# Patient Record
Sex: FEMALE | HISPANIC OR LATINO | URBAN - METROPOLITAN AREA
[De-identification: names, ages, dates, MRNs, and addresses within clinical notes are randomized per-mention and may not be internally consistent; named-entity substitution may affect disease eponyms.]

---

## 2019-04-11 ENCOUNTER — WALK IN (OUTPATIENT)
Dept: URGENT CARE | Age: 24
End: 2019-04-11

## 2019-04-11 DIAGNOSIS — J02.0 STREPTOCOCCAL PHARYNGITIS: Primary | ICD-10-CM

## 2019-04-11 LAB
INTERNAL PROCEDURAL CONTROLS ACCEPTABLE: YES
S PYO AG THROAT QL IA.RAPID: POSITIVE

## 2019-04-11 PROCEDURE — X0943 AMG SELF PAY VISIT: HCPCS | Performed by: NURSE PRACTITIONER

## 2019-04-11 RX ORDER — AMOXICILLIN 875 MG/1
875 TABLET, COATED ORAL 2 TIMES DAILY
Qty: 20 TABLET | Refills: 0 | Status: SHIPPED | OUTPATIENT
Start: 2019-04-11 | End: 2019-04-21

## 2019-04-11 ASSESSMENT — PAIN SCALES - GENERAL: PAINLEVEL: 9-10

## 2019-04-11 ASSESSMENT — ENCOUNTER SYMPTOMS
FEVER: 1
NAUSEA: 1
FATIGUE: 1
CHILLS: 1
SORE THROAT: 1
APPETITE CHANGE: 1
RESPIRATORY NEGATIVE: 1

## 2021-05-26 VITALS
HEIGHT: 61 IN | WEIGHT: 159.06 LBS | HEART RATE: 110 BPM | BODY MASS INDEX: 30.03 KG/M2 | SYSTOLIC BLOOD PRESSURE: 100 MMHG | RESPIRATION RATE: 12 BRPM | TEMPERATURE: 99.7 F | DIASTOLIC BLOOD PRESSURE: 60 MMHG

## 2021-07-13 ENCOUNTER — OFFICE VISIT (OUTPATIENT)
Dept: SURGERY | Age: 26
End: 2021-07-13

## 2021-07-13 VITALS
SYSTOLIC BLOOD PRESSURE: 115 MMHG | OXYGEN SATURATION: 98 % | BODY MASS INDEX: 30.47 KG/M2 | DIASTOLIC BLOOD PRESSURE: 78 MMHG | HEART RATE: 68 BPM | HEIGHT: 60 IN | WEIGHT: 155.2 LBS | TEMPERATURE: 97.7 F

## 2021-07-13 DIAGNOSIS — N64.82 MICROMASTIA: Primary | ICD-10-CM

## 2021-07-13 DIAGNOSIS — E88.1 LIPODYSTROPHY: ICD-10-CM

## 2021-07-13 PROCEDURE — 99203 OFFICE O/P NEW LOW 30 MIN: CPT | Performed by: SURGERY

## 2021-07-13 ASSESSMENT — PAIN SCALES - GENERAL: PAINLEVEL: 0

## 2021-11-12 ENCOUNTER — TELEPHONE (OUTPATIENT)
Dept: SURGERY | Age: 26
End: 2021-11-12

## 2021-11-24 ENCOUNTER — TELEPHONE (OUTPATIENT)
Dept: SURGERY | Age: 26
End: 2021-11-24

## 2022-12-07 LAB
AMB EXT CHLAMYDIA, NUCLEIC ACID AMP: NEGATIVE
AMB EXT GONOCOCCUS, NUCLEIC ACID AMP: NEGATIVE
AMB EXT TREPONEMAL ANTIBODIES: NONREACTIVE
HEPATITIS B SURFACE ANTIGEN OB: NEGATIVE
HIV RESULT OB: NEGATIVE

## 2022-12-08 LAB
ANTIBODY SCREEN OB: NEGATIVE
RH FACTOR OB: POSITIVE

## 2023-05-03 LAB
AMB EXT TREPONEMAL ANTIBODIES: NEGATIVE
HIV RESULT OB: NEGATIVE

## 2023-06-25 ENCOUNTER — HOSPITAL ENCOUNTER (INPATIENT)
Facility: HOSPITAL | Age: 28
LOS: 2 days | Discharge: HOME | End: 2023-06-27
Attending: OBSTETRICS & GYNECOLOGY | Admitting: OBSTETRICS & GYNECOLOGY
Payer: MEDICAID

## 2023-06-25 PROBLEM — Z34.90 PREGNANCY: Status: ACTIVE | Noted: 2023-06-25

## 2023-06-25 LAB
ANTIBODY SCREEN: NEGATIVE
BASOPHILS # BLD AUTO: 0.03 X10(3) UL (ref 0–0.2)
BASOPHILS NFR BLD AUTO: 0.2 %
DEPRECATED RDW RBC AUTO: 39.9 FL (ref 35.1–46.3)
EOSINOPHIL # BLD AUTO: 0.09 X10(3) UL (ref 0–0.7)
EOSINOPHIL NFR BLD AUTO: 0.7 %
ERYTHROCYTE [DISTWIDTH] IN BLOOD BY AUTOMATED COUNT: 12.7 % (ref 11–15)
HCT VFR BLD AUTO: 35.9 %
HGB BLD-MCNC: 12.1 G/DL
IMM GRANULOCYTES # BLD AUTO: 0.04 X10(3) UL (ref 0–1)
IMM GRANULOCYTES NFR BLD: 0.3 %
LYMPHOCYTES # BLD AUTO: 2.19 X10(3) UL (ref 1–4)
LYMPHOCYTES NFR BLD AUTO: 16.4 %
MCH RBC QN AUTO: 29.2 PG (ref 26–34)
MCHC RBC AUTO-ENTMCNC: 33.7 G/DL (ref 31–37)
MCV RBC AUTO: 86.5 FL
MONOCYTES # BLD AUTO: 0.63 X10(3) UL (ref 0.1–1)
MONOCYTES NFR BLD AUTO: 4.7 %
NEUTROPHILS # BLD AUTO: 10.38 X10 (3) UL (ref 1.5–7.7)
NEUTROPHILS # BLD AUTO: 10.38 X10(3) UL (ref 1.5–7.7)
NEUTROPHILS NFR BLD AUTO: 77.7 %
PLATELET # BLD AUTO: 184 10(3)UL (ref 150–450)
RBC # BLD AUTO: 4.15 X10(6)UL
RH BLOOD TYPE: POSITIVE
RH BLOOD TYPE: POSITIVE
WBC # BLD AUTO: 13.4 X10(3) UL (ref 4–11)

## 2023-06-25 PROCEDURE — 59409 OBSTETRICAL CARE: CPT | Performed by: OBSTETRICS & GYNECOLOGY

## 2023-06-25 RX ORDER — AMMONIA INHALANTS 0.04 G/.3ML
0.3 INHALANT RESPIRATORY (INHALATION) AS NEEDED
Status: DISCONTINUED | OUTPATIENT
Start: 2023-06-25 | End: 2023-06-27

## 2023-06-25 RX ORDER — BISACODYL 10 MG
10 SUPPOSITORY, RECTAL RECTAL ONCE AS NEEDED
Status: DISCONTINUED | OUTPATIENT
Start: 2023-06-25 | End: 2023-06-27

## 2023-06-25 RX ORDER — SIMETHICONE 80 MG
80 TABLET,CHEWABLE ORAL 3 TIMES DAILY PRN
Status: DISCONTINUED | OUTPATIENT
Start: 2023-06-25 | End: 2023-06-27

## 2023-06-25 RX ORDER — DEXTROSE, SODIUM CHLORIDE, SODIUM LACTATE, POTASSIUM CHLORIDE, AND CALCIUM CHLORIDE 5; .6; .31; .03; .02 G/100ML; G/100ML; G/100ML; G/100ML; G/100ML
INJECTION, SOLUTION INTRAVENOUS CONTINUOUS
Status: DISCONTINUED | OUTPATIENT
Start: 2023-06-25 | End: 2023-06-25 | Stop reason: HOSPADM

## 2023-06-25 RX ORDER — LIDOCAINE HYDROCHLORIDE 10 MG/ML
30 INJECTION, SOLUTION EPIDURAL; INFILTRATION; INTRACAUDAL; PERINEURAL ONCE
Status: COMPLETED | OUTPATIENT
Start: 2023-06-25 | End: 2023-06-25

## 2023-06-25 RX ORDER — TRISODIUM CITRATE DIHYDRATE AND CITRIC ACID MONOHYDRATE 500; 334 MG/5ML; MG/5ML
30 SOLUTION ORAL AS NEEDED
Status: DISCONTINUED | OUTPATIENT
Start: 2023-06-25 | End: 2023-06-25 | Stop reason: HOSPADM

## 2023-06-25 RX ORDER — LIDOCAINE HYDROCHLORIDE 10 MG/ML
INJECTION, SOLUTION EPIDURAL; INFILTRATION; INTRACAUDAL; PERINEURAL
Status: DISCONTINUED
Start: 2023-06-25 | End: 2023-06-25 | Stop reason: WASHOUT

## 2023-06-25 RX ORDER — ALBUTEROL SULFATE 2.5 MG/3ML
3 SOLUTION RESPIRATORY (INHALATION) EVERY 6 HOURS PRN
Status: ON HOLD | COMMUNITY
End: 2023-06-25

## 2023-06-25 RX ORDER — ACETAMINOPHEN 500 MG
1000 TABLET ORAL EVERY 6 HOURS PRN
Status: DISCONTINUED | OUTPATIENT
Start: 2023-06-25 | End: 2023-06-25 | Stop reason: HOSPADM

## 2023-06-25 RX ORDER — DOCUSATE SODIUM 100 MG/1
100 CAPSULE, LIQUID FILLED ORAL
Status: DISCONTINUED | OUTPATIENT
Start: 2023-06-25 | End: 2023-06-27

## 2023-06-25 RX ORDER — ALBUTEROL SULFATE 90 UG/1
2 AEROSOL, METERED RESPIRATORY (INHALATION) EVERY 6 HOURS PRN
COMMUNITY
End: 2023-06-27

## 2023-06-25 RX ORDER — ONDANSETRON 2 MG/ML
4 INJECTION INTRAMUSCULAR; INTRAVENOUS EVERY 6 HOURS PRN
Status: DISCONTINUED | OUTPATIENT
Start: 2023-06-25 | End: 2023-06-25 | Stop reason: HOSPADM

## 2023-06-25 RX ORDER — CHOLECALCIFEROL (VITAMIN D3) 25 MCG
1 TABLET,CHEWABLE ORAL DAILY
COMMUNITY

## 2023-06-25 RX ORDER — IBUPROFEN 600 MG/1
600 TABLET ORAL ONCE AS NEEDED
Status: DISCONTINUED | OUTPATIENT
Start: 2023-06-25 | End: 2023-06-25 | Stop reason: HOSPADM

## 2023-06-25 RX ORDER — SODIUM CHLORIDE, SODIUM LACTATE, POTASSIUM CHLORIDE, CALCIUM CHLORIDE 600; 310; 30; 20 MG/100ML; MG/100ML; MG/100ML; MG/100ML
INJECTION, SOLUTION INTRAVENOUS AS NEEDED
Status: DISCONTINUED | OUTPATIENT
Start: 2023-06-25 | End: 2023-06-25 | Stop reason: HOSPADM

## 2023-06-25 RX ORDER — ACETAMINOPHEN 500 MG
1000 TABLET ORAL EVERY 6 HOURS PRN
Status: DISCONTINUED | OUTPATIENT
Start: 2023-06-25 | End: 2023-06-27

## 2023-06-25 RX ORDER — TERBUTALINE SULFATE 1 MG/ML
0.25 INJECTION, SOLUTION SUBCUTANEOUS AS NEEDED
Status: DISCONTINUED | OUTPATIENT
Start: 2023-06-25 | End: 2023-06-25 | Stop reason: HOSPADM

## 2023-06-25 RX ORDER — IBUPROFEN 600 MG/1
600 TABLET ORAL EVERY 6 HOURS
Status: DISCONTINUED | OUTPATIENT
Start: 2023-06-25 | End: 2023-06-27

## 2023-06-25 RX ORDER — ONDANSETRON 2 MG/ML
4 INJECTION INTRAMUSCULAR; INTRAVENOUS EVERY 6 HOURS PRN
Status: DISCONTINUED | OUTPATIENT
Start: 2023-06-25 | End: 2023-06-27

## 2023-06-25 RX ORDER — ACETAMINOPHEN 500 MG
500 TABLET ORAL EVERY 6 HOURS PRN
Status: DISCONTINUED | OUTPATIENT
Start: 2023-06-25 | End: 2023-06-27

## 2023-06-25 RX ORDER — OXYTOCIN 10 [USP'U]/ML
INJECTION, SOLUTION INTRAMUSCULAR; INTRAVENOUS
Status: DISCONTINUED
Start: 2023-06-25 | End: 2023-06-25 | Stop reason: WASHOUT

## 2023-06-25 RX ORDER — DIAPER,BRIEF,INFANT-TODD,DISP
1 EACH MISCELLANEOUS EVERY 6 HOURS PRN
Status: DISCONTINUED | OUTPATIENT
Start: 2023-06-25 | End: 2023-06-27

## 2023-06-25 RX ORDER — ACETAMINOPHEN 500 MG
500 TABLET ORAL EVERY 6 HOURS PRN
Status: DISCONTINUED | OUTPATIENT
Start: 2023-06-25 | End: 2023-06-25 | Stop reason: HOSPADM

## 2023-06-25 NOTE — H&P
[address] Patient Status:  Outpatient    1995 MRN [id number]   Location [address] [place] [address] Attending Luiza Del Real MD   Hosp Day # 0 PCP No primary care provider on file. Date of Admission:  2023      HPI:   Simon Enciso is a 32year old  female, current EGA of 36w4d with an estimated date of delivery of: Estimated Date of Delivery: 23      Simon Enciso is being admitted for labor management. Her current obstetrical history is significant for asthma. Patient reports contractions since earlier today . Fetal Movement: normal.     History   Obstetric History:   OB History    Para Term  AB Living   4 2 2   1 2   SAB IAB Ectopic Multiple Live Births           2      # Outcome Date GA Lbr Erick/2nd Weight Sex Delivery Anes PTL Lv   4 Current            3 AB            2 Term 08/05/15 37w5d  5 lb 6 oz (2.438 kg) F  EPI Y JAILYN   1 Term     M NORMAL SPONT EPI  JAILYN     Past Medical History: No past medical history on file. Past Social History: No past surgical history on file. Family History: No family history on file. Social History: Social History    Tobacco Use      Smoking status: Not on file      Smokeless tobacco: Not on file    Alcohol use: Not on file       Allergies/Medications:    Allergies:   No Known Allergies  Medications:  albuterol 108 (90 Base) MCG/ACT Inhalation Aero Soln, Inhale 2 puffs into the lungs every 6 (six) hours as needed for Wheezing., Disp: , Rfl:   albuterol (2.5 MG/3ML) 0.083% Inhalation Nebu Soln, Take 3 mL by nebulization every 6 (six) hours as needed for Wheezing., Disp: , Rfl:         Review of Systems:   As documented in HPI    contractions since earlier today    Physical Exam:        Constitutional: alert, appears stated age and cooperative  Respiratory: clear to auscultation bilaterally  Cardiac: regular rate and rhythm, S1, S2 normal, no murmur, click, rub or gallop  Abdomen: FHT present  Fetal Surveillance:  140 BPM; Fetal heart variability: moderate  Sterile vaginal exam: Complete/ +1 station  Cervix: no lesions or cervical motion tenderness    Results:     Lab Results   Component Value Date    TREPONEMALAB negative 05/03/2023    RAPIDHIVSCRN Negative 05/03/2023    ABO A 12/08/2022    RH Positive 12/08/2022       No results found for: DDIMER, ESRML, ESRPF, CRP, BNP, MG, PHOS, TROP, TROPHS, CK, CKMB, CRISSY, RPR, B12, ETOH, COLORUR, CLARITY, SPECGRAVITY, PROUR, GLUUR, KETUR, BILUR, BLOODURINE, NITRITE, UROBILINOGEN, LEUUR, UASA, POCGLU, BLDCUL, BLDSMR    No results found. Assessment/Plan:    Warren Shane is at an estimated gestational age of 37w2d with an estimated date of delivery of:  Estimated Date of Delivery: 7/19/23    Active phase labor. Obstetrical history significant for asthma. Admission problem(s):    Pregnancy  Labor    Imminent delivery      Risks, benefits, alternatives and possible complications have been discussed in detail with the patient. Pre-admission, admission, and post admission procedures and expectations were discussed in detail. All questions answered, all appropriate consents will be signed at the Hospital. Admission is planned for today. Expectant management. Jermaine Mata MD  6/25/2023  8:30 AM

## 2023-06-25 NOTE — PLAN OF CARE
Mom received into postpartum room in stable condition with all belongings and care partner. Assisted into bed in low locked position with side rails up and call light in reach. Admission fundal assessment completed, vitals stable, bleeding within normal limits. Report received from labor RN Arcadio Delgado. Patient and family oriented to room, plan of care discussed.

## 2023-06-25 NOTE — PROGRESS NOTES
Patient up to bathroom with assist x 2. Voided 400 ml. Patient transferred to mother/baby room 359 per wheelchair in stable condition with baby and personal belongings. Accompanied by mom and staff. Report given to LIAM LOPEZ RN.

## 2023-06-25 NOTE — PROGRESS NOTES
Pt is a 32year old female admitted to [facility] [address] [place] [address]. No chief complaint on file. laboring since 545   Pt is  36w4d intra-uterine pregnancy. History obtained, consents signed. Oriented to room, staff, and plan of care.

## 2023-06-26 PROBLEM — Z28.39 RUBELLA NON-IMMUNE STATUS, ANTEPARTUM: Status: ACTIVE | Noted: 2023-06-26

## 2023-06-26 PROBLEM — O09.899 RUBELLA NON-IMMUNE STATUS, ANTEPARTUM: Status: ACTIVE | Noted: 2023-06-26

## 2023-06-26 LAB
BASOPHILS # BLD AUTO: 0.03 X10(3) UL (ref 0–0.2)
BASOPHILS NFR BLD AUTO: 0.3 %
DEPRECATED RDW RBC AUTO: 38.4 FL (ref 35.1–46.3)
EOSINOPHIL # BLD AUTO: 0.22 X10(3) UL (ref 0–0.7)
EOSINOPHIL NFR BLD AUTO: 2.4 %
ERYTHROCYTE [DISTWIDTH] IN BLOOD BY AUTOMATED COUNT: 12.6 % (ref 11–15)
HCT VFR BLD AUTO: 31.1 %
HGB BLD-MCNC: 10.7 G/DL
IMM GRANULOCYTES # BLD AUTO: 0.04 X10(3) UL (ref 0–1)
IMM GRANULOCYTES NFR BLD: 0.4 %
LYMPHOCYTES # BLD AUTO: 2.46 X10(3) UL (ref 1–4)
LYMPHOCYTES NFR BLD AUTO: 26.4 %
MCH RBC QN AUTO: 29.4 PG (ref 26–34)
MCHC RBC AUTO-ENTMCNC: 34.4 G/DL (ref 31–37)
MCV RBC AUTO: 85.4 FL
MONOCYTES # BLD AUTO: 0.68 X10(3) UL (ref 0.1–1)
MONOCYTES NFR BLD AUTO: 7.3 %
NEUTROPHILS # BLD AUTO: 5.89 X10 (3) UL (ref 1.5–7.7)
NEUTROPHILS # BLD AUTO: 5.89 X10(3) UL (ref 1.5–7.7)
NEUTROPHILS NFR BLD AUTO: 63.2 %
PLATELET # BLD AUTO: 169 10(3)UL (ref 150–450)
RBC # BLD AUTO: 3.64 X10(6)UL
WBC # BLD AUTO: 9.3 X10(3) UL (ref 4–11)

## 2023-06-26 NOTE — CM/SW NOTE
SW self referral due to finances/WIC resources    SW met with patient bedside. SW confirmed face sheet contact as correct. Baby boy/girl name:Baby boy [email or web address]  Date & time of delivery:6/25/23 @ 8:33am  Delivery method:Normal spontaneous vaginal delivery   Siblings age: 15 and 9 yr old    Patient employed: Yes  Length of maternity leave: 16 weeks    Father of baby employed:Yes  Length of paternity leave:6 weeks    Breast or formula feed:Formula feed    Pediatrician: BALTAZAR  SW encouraged pt to schedule infant first appointment (usually within 48 hours of discharge) prior to pt discharge. Pt expressed understanding. Infant Insurance:Medicaid  Change HC contacted:Yes    Mental Health History: Denied     Medications:n/a    Therapist:n/a    Psychiatrist:n/a    SW discussed signs, symptoms and risks associated with post partum depression & anxiety. SW provided pt with PMAD resources. Other resources provided: [facility] area specific resources. Pt endorses she is current w/WIC services. Patient support system: Pt's mother    Patient denied current questions/needs from MARIO.    SW/CM to remain available for support and/or discharge planning.       ONI Turcios, [facility]  Social Work   ZXK:#[id number]

## 2023-06-26 NOTE — PLAN OF CARE
Problem: POSTPARTUM  Goal: Long Term Goal:Experiences normal postpartum course  Description: INTERVENTIONS:  - Assess and monitor vital signs and lab values. - Assess fundus and lochia. - Provide ice/sitz baths for perineum discomfort. - Monitor healing of incision/episiotomy/laceration, and assess for signs and symptoms of infection and hematoma. - Assess bladder function and monitor for bladder distention.  - Provide/instruct/assist with pericare as needed. - Provide VTE prophylaxis as needed. - Monitor bowel function.  - Encourage ambulation and provide assistance as needed. - Assess and monitor emotional status and provide social service/psych resources as needed. - Utilize standard precautions and use personal protective equipment as indicated. Ensure aseptic care of all intravenous lines and invasive tubes/drains.  - Obtain immunization and exposure to communicable diseases history. Outcome: Progressing  Goal: Experiences normal breast weaning course  Description: INTERVENTIONS:  - Assess for and manage engorgement. - Instruct on breast care. - Provide comfort measures. Outcome: Progressing  Goal: Appropriate maternal -  bonding  Description: INTERVENTIONS:  - Assess caregiver- interactions. - Assess caregiver's emotional status and coping mechanisms. - Encourage caregiver to participate in  daily care. - Assess support systems available to mother/family.  - Provide /case management support as needed. Outcome: Progressing   Sat with patient and updated patient and family on plan of care. Pain medication discussed and answered all questions. Vitals are WNL. Bottle feeding baby every 3 hours successfully. Lochia WNL and fundus is firm. Will call RN with any questions.

## 2023-06-27 VITALS
SYSTOLIC BLOOD PRESSURE: 110 MMHG | HEART RATE: 75 BPM | DIASTOLIC BLOOD PRESSURE: 67 MMHG | RESPIRATION RATE: 16 BRPM | HEIGHT: 60 IN | TEMPERATURE: 98 F

## 2023-06-27 PROCEDURE — 99238 HOSP IP/OBS DSCHRG MGMT 30/<: CPT | Performed by: OBSTETRICS & GYNECOLOGY

## 2023-06-27 RX ORDER — IBUPROFEN 600 MG/1
600 TABLET ORAL EVERY 6 HOURS
Qty: 60 TABLET | Refills: 0 | Status: SHIPPED | OUTPATIENT
Start: 2023-06-27

## 2023-06-27 RX ORDER — PSEUDOEPHEDRINE HCL 30 MG
100 TABLET ORAL 2 TIMES DAILY PRN
Qty: 60 CAPSULE | Refills: 0 | Status: SHIPPED | OUTPATIENT
Start: 2023-06-27

## 2023-06-27 RX ORDER — ALBUTEROL SULFATE 90 UG/1
2 AEROSOL, METERED RESPIRATORY (INHALATION) EVERY 6 HOURS PRN
Qty: 1 EACH | Refills: 0 | Status: SHIPPED | OUTPATIENT
Start: 2023-06-27

## 2023-06-27 NOTE — DISCHARGE SUMMARY
[facility]    Discharge Summary    Pradip Bal Patient Status:  Inpatient    1995 MRN [id number]   Location [facility] 3SE Attending Georgiana Crowe MD   [facility] Day # 2       Delivering OB Clinician: Dr. Piedad Gambino    Admission Date: 2023      EDC: Estimated Date of Delivery: 23    Gestational Age: 36w4d    Antepartum complications: Patient Active Problem List:     Pregnancy     Postpartum care following vaginal delivery     Rubella non-immune status, antepartum     Normal labor and delivery      Date of Delivery: 2023 Time of Delivery: 8:33 AM    Delivery Type: spontaneous vaginal delivery    Baby: Liveborn male Information for the patient's : [place] [[id number]]   6 lb 1 oz (2.75 kg)  Apgars:  1 minute: 9  5 minutes: 910 minutes:       Intrapartum Complications: None    Discharge Date: 2023    Hospital Course: Pt admitted complete and delivered soon after. No complications. Routine delivery and postpartum care.       Discharged Condition: stable    Disposition: home    Plan:     Follow-up appointment in 2 weeks with Dr. Quenton Councilman, MD  2023  11:33 AM

## 2024-12-26 ENCOUNTER — V-VISIT (OUTPATIENT)
Dept: URGENT CARE | Age: 29
End: 2024-12-26

## 2024-12-26 VITALS
BODY MASS INDEX: 30.88 KG/M2 | WEIGHT: 163.58 LBS | HEART RATE: 91 BPM | RESPIRATION RATE: 16 BRPM | DIASTOLIC BLOOD PRESSURE: 62 MMHG | HEIGHT: 61 IN | OXYGEN SATURATION: 100 % | SYSTOLIC BLOOD PRESSURE: 116 MMHG | TEMPERATURE: 98.2 F

## 2024-12-26 DIAGNOSIS — Z20.2 EXPOSURE TO CHLAMYDIA: Primary | ICD-10-CM

## 2024-12-26 DIAGNOSIS — Z11.3 ROUTINE SCREENING FOR STI (SEXUALLY TRANSMITTED INFECTION): ICD-10-CM

## 2024-12-26 PROCEDURE — 99203 OFFICE O/P NEW LOW 30 MIN: CPT | Performed by: NURSE PRACTITIONER

## 2024-12-26 RX ORDER — DOXYCYCLINE HYCLATE 100 MG
100 TABLET ORAL 2 TIMES DAILY
Qty: 14 TABLET | Refills: 0 | Status: SHIPPED | OUTPATIENT
Start: 2024-12-26 | End: 2025-01-02

## 2024-12-26 ASSESSMENT — ENCOUNTER SYMPTOMS
FEVER: 0
ABDOMINAL PAIN: 0
SORE THROAT: 0

## 2024-12-27 LAB
BACTERIAL VAGINOSIS VAG-IMP: NOT DETECTED
C ALBICANS DNA VAG QL NAA+PROBE: NOT DETECTED
C GLABRATA DNA VAG QL NAA+PROBE: NOT DETECTED
M GENITALIUM DNA SPEC QL NAA+PROBE: NOT DETECTED
SERVICE CMNT-IMP: NORMAL
T VAGINALIS DNA VAG QL NAA+PROBE: NOT DETECTED

## 2024-12-30 ENCOUNTER — TELEPHONE (OUTPATIENT)
Dept: URGENT CARE | Age: 29
End: 2024-12-30

## 2024-12-31 ENCOUNTER — TELEPHONE (OUTPATIENT)
Dept: FAMILY MEDICINE | Age: 29
End: 2024-12-31

## 2024-12-31 DIAGNOSIS — A74.9 CHLAMYDIA: Primary | ICD-10-CM

## 2024-12-31 LAB
C TRACH RRNA SPEC QL NAA+PROBE: POSITIVE
Lab: ABNORMAL
N GONORRHOEA RRNA SPEC QL NAA+PROBE: NEGATIVE

## 2024-12-31 RX ORDER — DOXYCYCLINE HYCLATE 100 MG
100 TABLET ORAL ONCE
Qty: 1 TABLET | Refills: 0 | Status: SHIPPED | OUTPATIENT
Start: 2024-12-31 | End: 2024-12-31